# Patient Record
Sex: MALE | Race: OTHER | HISPANIC OR LATINO | URBAN - METROPOLITAN AREA
[De-identification: names, ages, dates, MRNs, and addresses within clinical notes are randomized per-mention and may not be internally consistent; named-entity substitution may affect disease eponyms.]

---

## 2024-01-02 ENCOUNTER — HOSPITAL ENCOUNTER (EMERGENCY)
Facility: HOSPITAL | Age: 49
Discharge: HOME | End: 2024-01-03
Attending: STUDENT IN AN ORGANIZED HEALTH CARE EDUCATION/TRAINING PROGRAM

## 2024-01-02 DIAGNOSIS — K80.20 CALCULUS OF GALLBLADDER WITHOUT CHOLECYSTITIS WITHOUT OBSTRUCTION: ICD-10-CM

## 2024-01-02 DIAGNOSIS — R07.9 CHEST PAIN: Primary | ICD-10-CM

## 2024-01-02 DIAGNOSIS — R10.11 RUQ PAIN: ICD-10-CM

## 2024-01-02 LAB
ALBUMIN SERPL BCP-MCNC: 4.5 G/DL (ref 3.5–5.2)
ALP SERPL-CCNC: 89 U/L (ref 55–135)
ALT SERPL W/O P-5'-P-CCNC: 28 U/L (ref 10–44)
ANION GAP SERPL CALC-SCNC: 8 MMOL/L (ref 8–16)
AST SERPL-CCNC: 22 U/L (ref 10–40)
BASOPHILS # BLD AUTO: 0.05 K/UL (ref 0–0.2)
BASOPHILS NFR BLD: 0.7 % (ref 0–1.9)
BILIRUB SERPL-MCNC: 0.4 MG/DL (ref 0.1–1)
BNP SERPL-MCNC: <10 PG/ML (ref 0–99)
BUN SERPL-MCNC: 13 MG/DL (ref 6–20)
CALCIUM SERPL-MCNC: 9.3 MG/DL (ref 8.7–10.5)
CHLORIDE SERPL-SCNC: 103 MMOL/L (ref 95–110)
CO2 SERPL-SCNC: 27 MMOL/L (ref 23–29)
CREAT SERPL-MCNC: 0.8 MG/DL (ref 0.5–1.4)
DIFFERENTIAL METHOD BLD: NORMAL
EOSINOPHIL # BLD AUTO: 0.1 K/UL (ref 0–0.5)
EOSINOPHIL NFR BLD: 1.8 % (ref 0–8)
ERYTHROCYTE [DISTWIDTH] IN BLOOD BY AUTOMATED COUNT: 13 % (ref 11.5–14.5)
EST. GFR  (NO RACE VARIABLE): >60 ML/MIN/1.73 M^2
GLUCOSE SERPL-MCNC: 92 MG/DL (ref 70–110)
HCT VFR BLD AUTO: 46.6 % (ref 40–54)
HCV AB SERPL QL IA: NORMAL
HGB BLD-MCNC: 15.5 G/DL (ref 14–18)
HIV 1+2 AB+HIV1 P24 AG SERPL QL IA: NORMAL
IMM GRANULOCYTES # BLD AUTO: 0.02 K/UL (ref 0–0.04)
IMM GRANULOCYTES NFR BLD AUTO: 0.3 % (ref 0–0.5)
LYMPHOCYTES # BLD AUTO: 1.6 K/UL (ref 1–4.8)
LYMPHOCYTES NFR BLD: 23.2 % (ref 18–48)
MCH RBC QN AUTO: 27.7 PG (ref 27–31)
MCHC RBC AUTO-ENTMCNC: 33.3 G/DL (ref 32–36)
MCV RBC AUTO: 83 FL (ref 82–98)
MONOCYTES # BLD AUTO: 0.5 K/UL (ref 0.3–1)
MONOCYTES NFR BLD: 7.3 % (ref 4–15)
NEUTROPHILS # BLD AUTO: 4.6 K/UL (ref 1.8–7.7)
NEUTROPHILS NFR BLD: 66.7 % (ref 38–73)
NRBC BLD-RTO: 0 /100 WBC
PLATELET # BLD AUTO: 241 K/UL (ref 150–450)
PMV BLD AUTO: 10.4 FL (ref 9.2–12.9)
POC CARDIAC TROPONIN I: 0 NG/ML (ref 0–0.08)
POTASSIUM SERPL-SCNC: 3.7 MMOL/L (ref 3.5–5.1)
PROT SERPL-MCNC: 7.8 G/DL (ref 6–8.4)
RBC # BLD AUTO: 5.6 M/UL (ref 4.6–6.2)
SAMPLE: NORMAL
SODIUM SERPL-SCNC: 138 MMOL/L (ref 136–145)
TROPONIN I SERPL DL<=0.01 NG/ML-MCNC: <0.006 NG/ML (ref 0–0.03)
WBC # BLD AUTO: 6.84 K/UL (ref 3.9–12.7)

## 2024-01-02 PROCEDURE — 84484 ASSAY OF TROPONIN QUANT: CPT | Performed by: EMERGENCY MEDICINE

## 2024-01-02 PROCEDURE — 93005 ELECTROCARDIOGRAM TRACING: CPT

## 2024-01-02 PROCEDURE — 87389 HIV-1 AG W/HIV-1&-2 AB AG IA: CPT | Performed by: STUDENT IN AN ORGANIZED HEALTH CARE EDUCATION/TRAINING PROGRAM

## 2024-01-02 PROCEDURE — 83880 ASSAY OF NATRIURETIC PEPTIDE: CPT | Performed by: EMERGENCY MEDICINE

## 2024-01-02 PROCEDURE — 99285 EMERGENCY DEPT VISIT HI MDM: CPT | Mod: 25

## 2024-01-02 PROCEDURE — 93010 ELECTROCARDIOGRAM REPORT: CPT | Mod: ,,, | Performed by: INTERNAL MEDICINE

## 2024-01-02 PROCEDURE — 25000003 PHARM REV CODE 250

## 2024-01-02 PROCEDURE — 86803 HEPATITIS C AB TEST: CPT | Performed by: STUDENT IN AN ORGANIZED HEALTH CARE EDUCATION/TRAINING PROGRAM

## 2024-01-02 PROCEDURE — 84484 ASSAY OF TROPONIN QUANT: CPT

## 2024-01-02 PROCEDURE — 85025 COMPLETE CBC W/AUTO DIFF WBC: CPT | Performed by: EMERGENCY MEDICINE

## 2024-01-02 PROCEDURE — 80053 COMPREHEN METABOLIC PANEL: CPT | Performed by: EMERGENCY MEDICINE

## 2024-01-02 RX ORDER — ACETAMINOPHEN 500 MG
1000 TABLET ORAL
Status: COMPLETED | OUTPATIENT
Start: 2024-01-02 | End: 2024-01-02

## 2024-01-02 RX ADMIN — ACETAMINOPHEN 1000 MG: 500 TABLET ORAL at 11:01

## 2024-01-03 VITALS
TEMPERATURE: 98 F | BODY MASS INDEX: 26.52 KG/M2 | RESPIRATION RATE: 18 BRPM | OXYGEN SATURATION: 98 % | WEIGHT: 175 LBS | HEIGHT: 68 IN | DIASTOLIC BLOOD PRESSURE: 78 MMHG | SYSTOLIC BLOOD PRESSURE: 138 MMHG | HEART RATE: 82 BPM

## 2024-01-03 LAB
LIPASE SERPL-CCNC: 16 U/L (ref 4–60)
TROPONIN I SERPL DL<=0.01 NG/ML-MCNC: <0.006 NG/ML (ref 0–0.03)

## 2024-01-03 PROCEDURE — 83690 ASSAY OF LIPASE: CPT

## 2024-01-03 PROCEDURE — 96374 THER/PROPH/DIAG INJ IV PUSH: CPT

## 2024-01-03 PROCEDURE — 63600175 PHARM REV CODE 636 W HCPCS

## 2024-01-03 PROCEDURE — 84484 ASSAY OF TROPONIN QUANT: CPT

## 2024-01-03 RX ORDER — KETOROLAC TROMETHAMINE 10 MG/1
10 TABLET, FILM COATED ORAL EVERY 6 HOURS PRN
Qty: 20 TABLET | Refills: 0 | Status: SHIPPED | OUTPATIENT
Start: 2024-01-03 | End: 2024-01-08

## 2024-01-03 RX ORDER — KETOROLAC TROMETHAMINE 30 MG/ML
10 INJECTION, SOLUTION INTRAMUSCULAR; INTRAVENOUS
Status: COMPLETED | OUTPATIENT
Start: 2024-01-03 | End: 2024-01-03

## 2024-01-03 RX ADMIN — KETOROLAC TROMETHAMINE 10 MG: 30 INJECTION, SOLUTION INTRAMUSCULAR; INTRAVENOUS at 01:01

## 2024-01-03 NOTE — DISCHARGE INSTRUCTIONS
Diagnosis: Chest pain, gallstone    I think your chest pain may be due to inflammation of your chest wall.  Your lab tests and chest x-ray did not show any concerning findings.  You do also have a gallstone.  This does not necessarily need any treatment.  I am prescribing medicine you can take as needed for pain.    I sent a referral to a surgery doctor for follow-up in case you have issues with your gallbladder in the future.  Also follow up with your primary care doctor.  If you start to have any worsening symptoms, please return to the emergency department.    Diagnóstico: dolor en el pecho, cálculos biliares.    Creo que chang dolor en el pecho puede deberse a la inflamación de la pared torácica. Pauly pruebas de laboratorio y chang radiografía de tórax no mostraron ningún hallazgo preocupante. También tienes un cálculo biliar. [place] no necesariamente necesita ningún tratamiento. Le estoy recetando medicamentos que puede twyla según sea necesario para el dolor.    Envié andres derivación a un médico cirujano para seguimiento en anyi de que tenga problemas con chang vesícula biliar en el futuro. También ani un seguimiento con chang médico de atención primaria. Si comienza a tener algún síntoma que empeora, regrese al departamento de emergencias.

## 2024-01-03 NOTE — ED PROVIDER NOTES
Encounter Date: 1/2/2024       History     Chief Complaint   Patient presents with    Chest Pain     48-year-old male with no significant medical history presents to the ED regarding right-sided chest pain/RUQ pain onset last night.  Patient states the pain started when he was lying down last night and has been constant since then.  Denies trauma.  No aggravating or alleviating factors.  Not worse with inspiration or movement.  Does admit to nausea when bending over but denies any lightheadedness or vomiting.  Denies shortness of breath, dizziness, URI symptoms, abdominal pain, back pain, or any other complaints at this time.    The history is provided by the patient and medical records. A  was used.     Review of patient's allergies indicates:  No Known Allergies  History reviewed. No pertinent past medical history.  History reviewed. No pertinent surgical history.  History reviewed. No pertinent family history.  Social History     Tobacco Use    Smoking status: Never    Smokeless tobacco: Never   Substance Use Topics    Alcohol use: Not Currently    Drug use: Never     Review of Systems   Constitutional:  Negative for fever.   HENT:  Negative for sore throat.    Respiratory:  Negative for shortness of breath.    Cardiovascular:  Positive for chest pain.   Gastrointestinal:  Negative for nausea.   Genitourinary:  Negative for dysuria.   Musculoskeletal:  Negative for back pain.   Skin:  Negative for rash.   Neurological:  Negative for weakness.   Hematological:  Does not bruise/bleed easily.       Physical Exam     Initial Vitals [01/02/24 1919]   BP Pulse Resp Temp SpO2   (!) 142/91 92 16 98.5 °F (36.9 °C) 99 %      MAP       --         Physical Exam    Vitals reviewed.  Constitutional: He appears well-developed and well-nourished. He is not diaphoretic. No distress.   HENT:   Head: Normocephalic and atraumatic.   Cardiovascular:  Normal rate, regular rhythm and normal heart sounds.     Exam  "reveals no gallop and no friction rub.       No murmur heard.  Pulmonary/Chest: Breath sounds normal. No respiratory distress. He has no wheezes. He has no rhonchi. He has no rales.   Abdominal: Abdomen is soft. He exhibits no distension. There is abdominal tenderness in the right upper quadrant. There is no rebound, no guarding and negative Barriga's sign.     Neurological: He is alert and oriented to person, place, and time.   Psychiatric: He has a normal mood and affect.         ED Course   Procedures  Labs Reviewed   HIV 1 / 2 ANTIBODY    Narrative:     Release to patient->Immediate   HEPATITIS C ANTIBODY    Narrative:     Release to patient->Immediate   CBC W/ AUTO DIFFERENTIAL   COMPREHENSIVE METABOLIC PANEL   TROPONIN I   B-TYPE NATRIURETIC PEPTIDE   TROPONIN ISTAT   LIPASE   TROPONIN I   TROPONIN I   LIPASE   POCT TROPONIN   POCT TROPONIN          Imaging Results              X-Ray Chest AP Portable (Final result)  Result time 01/03/24 00:11:11      Final result by Ang Nixon MD (01/03/24 00:11:11)                   Impression:      No acute cardiopulmonary finding identified on this single view.      Electronically signed by: Ang Nixon MD  Date:    01/03/2024  Time:    00:11               Narrative:    EXAMINATION:  XR CHEST AP PORTABLE    CLINICAL HISTORY:  Provided history is "Chest Pain;  ".    TECHNIQUE:  One view of the chest.    COMPARISON:  None.    FINDINGS:  Cardiac silhouette is not enlarged.  No focal consolidation.  No sizable pleural effusion.  No pneumothorax.                                       US Abdomen Limited (Gallbladder) (Final result)  Result time 01/02/24 23:55:53      Final result by Ang Nixon MD (01/02/24 23:55:53)                   Impression:      3 mm adherent stone or polyp along the gallbladder wall.  No other cholelithiasis or sonographic findings of acute cholecystitis.      Electronically signed by: Ang Nixon" MD  Date:    01/02/2024  Time:    23:55               Narrative:    EXAMINATION:  US ABDOMEN LIMITED    CLINICAL HISTORY:  Right upper quadrant abdominal pain.    TECHNIQUE:  Limited ultrasound of the right upper quadrant of the abdomen including pancreas, liver, gallbladder, common bile duct was performed.    COMPARISON:  None.    FINDINGS:  Liver: Homogeneous echotexture. No focal hepatic lesions.    Gallbladder: Nonmobile echogenic focus along the anterior gallbladder wall measuring 3 mm in size, likely a polyp or adherent gallstone.  Otherwise, no calculi, wall thickening, or pericholecystic fluid.  No sonographic Barriga's sign.    Biliary system: The common duct is not dilated, measuring 4-5 mm in diameter at the distal common bile duct.  No intrahepatic ductal dilatation.    Pancreas: The visualized portions of pancreas appear normal, allowing for overlying bowel gas shadowing.    Miscellaneous: No ascites. Limited evaluation of the right kidney is negative for hydronephrosis.                                       Medications   acetaminophen tablet 1,000 mg (1,000 mg Oral Given 1/2/24 0863)   ketorolac injection 9.999 mg (9.999 mg Intravenous Given 1/3/24 0118)     Medical Decision Making  Amount and/or Complexity of Data Reviewed  Labs: ordered. Decision-making details documented in ED Course.  Radiology: ordered. Decision-making details documented in ED Course.    Risk  OTC drugs.  Prescription drug management.         APC / Resident Notes:   48-year-old male with no significant medical history presents to the ED regarding constant, right-sided chest pain/RUQ pain onset last night.  Vitals stable.  Nontoxic appearing, in no acute distress.  Abdomen is soft, mild tenderness to RUQ. Will get chest pain work up and gallbladder US.    My differential diagnoses include but are not limited to:   Cholecystitis, cholelithiasis, pancreatitis, ACS, costochondritis, muscle strain, electrolyte abnormality, [place]    See ED  course.  I have reviewed the patient's records and discussed with my supervising physician.        ED Course as of 01/03/24 0206   Tue Jan 02, 2024   1940 EKG with normal sinus rhythm, rate 83, no STEMI. [NN]   2234 WBC: 6.84  No leukocytosis [KB]   2234 Hemoglobin: 15.5  No anemia [KB]   2240 BNP: <10 [KB]   2241 Troponin I: <0.006 [KB]   2313 Sodium: 138 [KB]   2313 Potassium: 3.7 [KB]   2313 Chloride: 103 [KB]   2313 Calcium: 9.3  No electrolyte abnormality [KB]   2313 Glucose: 92  Euglycemic [KB]   2313 CO2: 27  No metabolic derangement [KB]   2313 Anion Gap: 8 [KB]   2313 BILIRUBIN TOTAL: 0.4  No hyperbilirubinemia [KB]   2313 ALP: 89 [KB]   2313 AST: 22 [KB]   2313 ALT: 28  No transaminitis [KB]   Wed Jan 03, 2024   0003 US Abdomen Limited (Gallbladder)  Impression:     3 mm adherent stone or polyp along the gallbladder wall.  No other cholelithiasis or sonographic findings of acute cholecystitis. [KB]   0009 US Impression:     3 mm adherent stone or polyp along the gallbladder wall.  No other cholelithiasis or sonographic findings of acute cholecystitis.   [NN]   0015 No sob   [NN]   0017 X-Ray Chest AP Portable  Impression:     No acute cardiopulmonary finding identified on this single view. [KB]   0147 Pending 2nd troponin and lipase signed out to Brook Torres PA-C.  Your progress note for results and final disposition.  Referral was placed to general surgery for cholelithiasis [KB]      ED Course User Index  [KB] Meagan Paez PA-C  [NN] Gayathri Geiger MD                           Clinical Impression:  Final diagnoses:  [R07.9] Chest pain  [R10.11] RUQ pain  [K80.20] Calculus of gallbladder without cholecystitis without obstruction (Primary)                 Meagan Paez PA-C  01/03/24 0206

## 2024-01-12 ENCOUNTER — TELEPHONE (OUTPATIENT)
Dept: SURGERY | Facility: CLINIC | Age: 49
End: 2024-01-12